# Patient Record
(demographics unavailable — no encounter records)

---

## 2024-11-01 NOTE — HISTORY OF PRESENT ILLNESS
[de-identified] : The patient is a 9-year-old male accompanied by his mother here for an evaluation of his left foot.  3 days ago while playing soccer in his home, he slipped on a rug and fell injuring his foot.  1 day ago the patient's mother noticed bruising and swelling of the left foot.  He has pain at rest and with ambulation.  He points over the lateral aspect of the dorsal surface of the left foot as to where his pain is.

## 2024-11-01 NOTE — DISCUSSION/SUMMARY
[de-identified] : I reviewed the x-ray findings with the patient and his mother.  Today he was placed into a short cam walker boot.  He can weight-bear as tolerated.  He will use ibuprofen for pain.  He will remain out of gym and sports.  He will follow-up in 3 weeks for further evaluation with Dr. Montanez.

## 2024-11-01 NOTE — IMAGING
[de-identified] : Physical exam left foot: Mild edema and ecchymosis noted over the dorsal surface of the left foot, the ecchymosis is by the third and fourth webspaces,, and over the fifth metatarsal.  Full range of motion of the left ankle without pain.  No tenderness to palpation over the Lisfranc joint.  No tenderness to palpation over the left calcaneus.  No test patient with the first second third metatarsals.  He has test patient over the fourth and fifth metatarsal shafts.  Intact to light touch, mild antalgic gait.

## 2024-11-01 NOTE — DATA REVIEWED
[Left] : left [Foot] : foot [FreeTextEntry1] : 3 weightbearing views of the left foot were obtained here in the office today and show: No fracture or dislocation.